# Patient Record
Sex: MALE | Race: WHITE | Employment: OTHER | URBAN - METROPOLITAN AREA
[De-identification: names, ages, dates, MRNs, and addresses within clinical notes are randomized per-mention and may not be internally consistent; named-entity substitution may affect disease eponyms.]

---

## 2018-11-13 ENCOUNTER — HOSPITAL ENCOUNTER (OUTPATIENT)
Dept: GENERAL RADIOLOGY | Facility: CLINIC | Age: 68
Discharge: HOME | End: 2018-11-13
Attending: FAMILY MEDICINE | Admitting: FAMILY MEDICINE
Payer: MEDICARE

## 2018-11-13 ENCOUNTER — OFFICE VISIT (OUTPATIENT)
Dept: FAMILY MEDICINE | Facility: CLINIC | Age: 68
End: 2018-11-13
Payer: COMMERCIAL

## 2018-11-13 VITALS
HEART RATE: 86 BPM | OXYGEN SATURATION: 97 % | BODY MASS INDEX: 30.16 KG/M2 | DIASTOLIC BLOOD PRESSURE: 88 MMHG | SYSTOLIC BLOOD PRESSURE: 168 MMHG | TEMPERATURE: 97.6 F | RESPIRATION RATE: 16 BRPM | WEIGHT: 170.2 LBS | HEIGHT: 63 IN

## 2018-11-13 DIAGNOSIS — Z12.11 COLON CANCER SCREENING: ICD-10-CM

## 2018-11-13 DIAGNOSIS — M75.101 ROTATOR CUFF TEAR ARTHROPATHY OF RIGHT SHOULDER: ICD-10-CM

## 2018-11-13 DIAGNOSIS — M25.511 ACUTE PAIN OF RIGHT SHOULDER: Primary | ICD-10-CM

## 2018-11-13 DIAGNOSIS — M25.511 ACUTE PAIN OF RIGHT SHOULDER: ICD-10-CM

## 2018-11-13 DIAGNOSIS — M12.811 ROTATOR CUFF TEAR ARTHROPATHY OF RIGHT SHOULDER: ICD-10-CM

## 2018-11-13 DIAGNOSIS — I10 HYPERTENSION GOAL BP (BLOOD PRESSURE) < 140/90: ICD-10-CM

## 2018-11-13 DIAGNOSIS — E78.5 HYPERLIPIDEMIA LDL GOAL <100: ICD-10-CM

## 2018-11-13 LAB
CHOLEST SERPL-MCNC: 270 MG/DL
DEPRECATED CALCIDIOL+CALCIFEROL SERPL-MC: 27 UG/L (ref 20–75)
HDLC SERPL-MCNC: 62 MG/DL
LDLC SERPL CALC-MCNC: 183 MG/DL
NONHDLC SERPL-MCNC: 208 MG/DL
TRIGL SERPL-MCNC: 123 MG/DL
TSH SERPL DL<=0.005 MIU/L-ACNC: 1.19 MU/L (ref 0.4–4)

## 2018-11-13 PROCEDURE — 36415 COLL VENOUS BLD VENIPUNCTURE: CPT | Performed by: FAMILY MEDICINE

## 2018-11-13 PROCEDURE — 20610 DRAIN/INJ JOINT/BURSA W/O US: CPT | Mod: RT | Performed by: FAMILY MEDICINE

## 2018-11-13 PROCEDURE — 84443 ASSAY THYROID STIM HORMONE: CPT | Performed by: FAMILY MEDICINE

## 2018-11-13 PROCEDURE — 73030 X-RAY EXAM OF SHOULDER: CPT | Mod: TC

## 2018-11-13 PROCEDURE — 82306 VITAMIN D 25 HYDROXY: CPT | Performed by: FAMILY MEDICINE

## 2018-11-13 PROCEDURE — 99214 OFFICE O/P EST MOD 30 MIN: CPT | Mod: 25 | Performed by: FAMILY MEDICINE

## 2018-11-13 PROCEDURE — 80061 LIPID PANEL: CPT | Performed by: FAMILY MEDICINE

## 2018-11-13 RX ORDER — LIDOCAINE HYDROCHLORIDE 10 MG/ML
3 INJECTION, SOLUTION INFILTRATION; PERINEURAL ONCE
Qty: 3 ML | Refills: 0 | OUTPATIENT
Start: 2018-11-13 | End: 2018-11-13

## 2018-11-13 RX ORDER — LISINOPRIL 5 MG/1
5 TABLET ORAL DAILY
Qty: 90 TABLET | Refills: 3 | Status: SHIPPED | OUTPATIENT
Start: 2018-11-13 | End: 2019-05-28

## 2018-11-13 RX ORDER — ATORVASTATIN CALCIUM 20 MG/1
20 TABLET, FILM COATED ORAL DAILY
Qty: 30 TABLET | Refills: 3 | Status: SHIPPED | OUTPATIENT
Start: 2018-11-13 | End: 2018-12-13

## 2018-11-13 RX ORDER — TRIAMCINOLONE ACETONIDE 40 MG/ML
40 INJECTION, SUSPENSION INTRA-ARTICULAR; INTRAMUSCULAR ONCE
Qty: 1 ML | Refills: 0 | OUTPATIENT
Start: 2018-11-13 | End: 2018-11-13

## 2018-11-13 RX ORDER — BUPIVACAINE HYDROCHLORIDE 5 MG/ML
1 INJECTION, SOLUTION PERINEURAL ONCE
Qty: 1 ML | Refills: 0 | OUTPATIENT
Start: 2018-11-13 | End: 2018-11-13

## 2018-11-13 RX ORDER — NAPROXEN 500 MG/1
500 TABLET ORAL 2 TIMES DAILY PRN
Qty: 60 TABLET | Refills: 0 | Status: SHIPPED | OUTPATIENT
Start: 2018-11-13 | End: 2018-12-13

## 2018-11-13 ASSESSMENT — PAIN SCALES - GENERAL: PAINLEVEL: EXTREME PAIN (8)

## 2018-11-13 NOTE — PROGRESS NOTES
SUBJECTIVE:   Andrea Conde is a 68 year old male who presents to clinic today for the following health issues:    Joint Pain    Onset: 3 months for left elbow, November 4 for the right shoulder    Description:   Location: right shoulder and left elbow  Character: right shoulder burning, left elbow dull ache    Intensity: severe    Progression of Symptoms: same    Accompanying Signs & Symptoms:  Other symptoms: right shoulder radiation of pain to right hand,     History:   Previous similar pain: no       Precipitating factors:   Trauma or overuse: YES - fell and hurt right shoulder, hit the left elbow when he was working on his tractor    Alleviating factors:  Improved by: nothing    Therapies Tried and outcome: ice, aleve, tylenol - none helped      Andrea is here today for the right shoulder pain.  He is known to have right shoulder impingement syndrome with rotator cuff arthropathy.  He saw an orthopedic about a year ago for it; did not recommend surgery.  He been doing well with minimal pain except of light weaker on the right shoulder.  On Nov 4, he tripped, failed and landed on his face.  It happened in the middle of the night so not really sure what exactly was happening.  It was on the floor for a while but was able to get up and went to bed.  He lives by himself.  The next day when he woke up, his started to have right shoulder pain with neck and shoulder stiffness.  The stiffness had resolved but the should pain persists.  Over-the-counter medication has not been effective.  Has not been seen for this.  In fact has not seen a doctor for more than a year.  It is constant and sharp, worse with movement of the right arm.  Radiate down to the deltoid area.  No weakness.  No numbness or tingling sensation.  No other concern.    Is known to have high blood pressure.  Was on medication for a while.  Been off the medication for over a years as well.  Has not checked his BP.  Denied of chest pain or shortness  "of breath.  No headache or dizziness.  No leg swelling, orthopnea or dyspnea.      Problem list and histories reviewed & adjusted, as indicated.  Additional history: as documented    Current Outpatient Prescriptions   Medication Sig Dispense Refill     lisinopril (PRINIVIL/ZESTRIL) 5 MG tablet Take 1 tablet (5 mg) by mouth daily 90 tablet 3     naproxen (NAPROSYN) 500 MG tablet Take 1 tablet (500 mg) by mouth 2 times daily as needed for moderate pain 60 tablet 0     No Known Allergies    Reviewed and updated as needed this visit by clinical staff  Tobacco  Allergies  Meds  Soc Hx      Reviewed and updated as needed this visit by Provider         ROS:  Constitutional, HEENT, cardiovascular, pulmonary, gi and gu systems are negative, except as otherwise noted.    OBJECTIVE:     /88 (BP Location: Left arm, Patient Position: Sitting, Cuff Size: Adult Regular)  Pulse 86  Temp 97.6  F (36.4  C) (Temporal)  Resp 16  Ht 5' 3.25\" (1.607 m)  Wt 170 lb 3.2 oz (77.2 kg)  SpO2 97%  BMI 29.91 kg/m2  Body mass index is 29.91 kg/(m^2).   GENERAL: healthy, alert and no distress   HENT: ear canals and TM's normal. Nares are non-congestive.  Oropharynx is pink ans moist. No tender with palpation to the sinuses.  NECK: supple, no adenopathy and thyroid normal to palpation.  No tender with palpation to the cervical spine and its para-vertebral muscle.  RESP: lungs clear to auscultation - no rales, rhonchi or wheezes  CV: regular rate and rhythm, no murmur.  No leg swelling  MS: No gross musculoskeletal defects noted, no edema.  Both hands are equally in strength and shoulders are symmetrical.  Tender with palpation to the right shoulder with has no swelling or redness - no warmth to touch.  Mild tender with palpation to the right deltoid muscle.  NEURO: Normal strength and tone, no focal deficit.      Diagnostic Test Results:  Results for orders placed or performed in visit on 11/13/18 (from the past 24 hour(s))   Lipid " panel reflex to direct LDL Fasting   Result Value Ref Range    Cholesterol 270 (H) <200 mg/dL    Triglycerides 123 <150 mg/dL    HDL Cholesterol 62 >39 mg/dL    LDL Cholesterol Calculated 183 (H) <100 mg/dL    Non HDL Cholesterol 208 (H) <130 mg/dL   TSH with free T4 reflex   Result Value Ref Range    TSH 1.19 0.40 - 4.00 mU/L     Xray - reviewed with patient and reviewed with the radiologist    ASSESSMENT/PLAN:     1. Acute pain of right shoulder  Precipitated after the fall about a week ago.  Known to have rotator cuff arthropathy.  Most likely due to arthritis flareup from the fall.  Xray today showed DJD of the shoulder.  Discussed with him about the nature of the condition.  Treatment options discussed include steroid injection.  He wants to give it a try and please see procedure note for further details.  If failed injections, consider physical therapy versus orthopedic referral.  Naproxen as needed for pain.  May continue with Tylenol as needed as well.  Normal activities as tolerated.  Call in or follow-up if any concerns or question.    - naproxen (NAPROSYN) 500 MG tablet; Take 1 tablet (500 mg) by mouth 2 times daily as needed for moderate pain  Dispense: 60 tablet; Refill: 0    2. Rotator cuff tear arthropathy of right shoulder  Stable, please see #1 above for further details.    - naproxen (NAPROSYN) 500 MG tablet; Take 1 tablet (500 mg) by mouth 2 times daily as needed for moderate pain  Dispense: 60 tablet; Refill: 0    3. Hypertension goal BP (blood pressure) < 140/90  Not controlled and his BP is high today. Not on medication for over a year.  Discussed with him about the nature of the condition and emphasize the importance of having it under controlled. Educated him about the long and short term consequences of uncontrolled HTN as well as the goal for his blood pressure.  Start the Lisinopril and side effect discussed.  Encouraged low salt diet and daily excercise. Lab as other.  Follow up in 1  month, earlier as needed.    - Lipid panel reflex to direct LDL Fasting  - TSH with free T4 reflex  - Vitamin D Deficiency  - lisinopril (PRINIVIL/ZESTRIL) 5 MG tablet; Take 1 tablet (5 mg) by mouth daily  Dispense: 90 tablet; Refill: 3  - atorvastatin (LIPITOR) 20 MG tablet; Take 1 tablet (20 mg) by mouth daily  Dispense: 30 tablet; Refill: 3    4. Hyperlipidemia  New diagnosis.  Discussed with him about the nature condition.  Emphasized on healthy diet and exercise.  Start low-dose of Lipitor.  Side effect discussed.  Monitor the liver function test in a month.    Colon cancer screening  Refused colonoscopy.  Not sure about the FIT test.  Discussed with him about the importance of colon cancer screening.  He is willing to take the risk.    - Fecal colorectal cancer screen (FIT); Future      Frank Greene Mai, MD  [facility]

## 2018-11-13 NOTE — NURSING NOTE
Chief Complaint   Patient presents with     Elbow left     x3 months     Shoulder right     fell november 4 [facility] Maintenance Due   Topic Date Due     HEPATITIS C SCREENING  06/18/1967     COLON CANCER SCREEN (SYSTEM ASSIGNED)  06/18/1999     PNEUMOCOCCAL (1 of 2 - PCV13) 06/18/2014     AORTIC ANEURYSM SCREENING (SYSTEM ASSIGNED)  06/18/2014     LIPID SCREEN Q5 YR MALE (SYSTEM ASSIGNED)  02/14/2017     ADVANCE DIRECTIVE PLANNING Q5 YRS  07/23/2017     INFLUENZA VACCINE (1) 09/01/2018     Natasha Maloney, [facility]

## 2018-11-13 NOTE — MR AVS SNAPSHOT
After Visit Summary   11/13/2018    Andrea Conde    MRN: [id number]           Patient Information     Date Of Birth          1950        Visit Information        Provider Department      11/13/2018 10:40 AM Frank Womack MD [facility]        Today's Diagnoses     Acute pain of right shoulder    -  1    Rotator cuff tear arthropathy of right shoulder        Hypertension goal BP (blood pressure) < 140/90        Colon cancer screening           Follow-ups after your visit        Follow-up notes from your care team     Return in about 1 month (around 12/13/2018) for folow up - high blood pressure.      Future tests that were ordered for you today     Open Future Orders        Priority Expected Expires Ordered    Fecal colorectal cancer screen (FIT) Routine 12/4/2018 2/5/2019 11/13/2018    XR Shoulder Right G/E 3 Views Routine 11/13/2018 11/13/2019 11/13/2018            Who to contact     If you have questions or need follow up information about today's clinic visit or your schedule please contact [facility] directly at [phone].  Normal or non-critical lab and imaging results will be communicated to you by MyChart, letter or phone within 4 business days after the clinic has received the results. If you do not hear from us within 7 days, please contact the clinic through MyChart or phone. If you have a critical or abnormal lab result, we will notify you by phone as soon as possible.  Submit refill requests through [employer] or call your pharmacy and they will forward the refill request to us. Please allow 3 business days for your refill to be completed.          Additional Information About Your Visit        Care EveryWhere ID     This is your Care EveryWhere ID. This could be used by other organizations to access your [facility] medical records  [id number]        Your Vitals Were     Pulse Temperature Respirations Height Pulse Oximetry BMI (Body Mass Index)    86  "97.6  F (36.4  C) (Temporal) 16 5' 3.25\" (1.607 m) 97% 29.91 kg/m2       Blood Pressure from Last 3 Encounters:   11/13/18 168/88   07/10/14 (!) 149/92   06/30/14 148/86    Weight from Last 3 Encounters:   11/13/18 170 lb 3.2 oz (77.2 kg)   07/10/14 172 lb (78 kg)   06/30/14 172 lb (78 kg)              We Performed the Following     Lipid panel reflex to direct LDL Fasting     TSH with free T4 reflex     Vitamin D Deficiency          Today's Medication Changes          These changes are accurate as of 11/13/18 12:24 PM.  If you have any questions, ask your nurse or doctor.               Start taking these medicines.        Dose/Directions    lisinopril 5 MG tablet   Commonly known as:  PRINIVIL/ZESTRIL   Used for:  Hypertension goal BP (blood pressure) < 140/90   Started by:  Frank Womack MD        Dose:  5 mg   Take 1 tablet (5 mg) by mouth daily   Quantity:  90 tablet   Refills:  3       naproxen 500 MG tablet   Commonly known as:  NAPROSYN   Used for:  Acute pain of right shoulder, Rotator cuff tear arthropathy of right shoulder   Started by:  Frank Womack MD        Dose:  500 mg   Take 1 tablet (500 mg) by mouth 2 times daily as needed for moderate pain   Quantity:  60 tablet   Refills:  0            Where to get your medicines      These medications were sent to [facility] [place] [id number] [facility]  [address] , [place] [place] [facility] [address]     Phone:  [phone]     lisinopril 5 MG tablet    naproxen 500 MG tablet                Primary Care Provider Fax #    Physician No Ref-Primary [phone]       No address on file        Equal Access to Services     JENA MUNGUIA AH: Hadii sanjuana Moseley, wamireyada luqadaha, qaybta kaalmada adeegyada, arelis waddell. So [facility] [phone].    ATENCIÓN: Si habla español, tiene a peck disposición servicios gratuitos de asistencia lingüística. Llame al [phone].    We comply with applicable federal civil rights laws " and [place] laws. We do not discriminate on the basis of race, color, national origin, age, disability, sex, sexual orientation, or gender identity.            Thank you!     Thank you for choosing [facility]  for your care. Our goal is always to provide you with excellent care. Hearing back from our patients is one way we can continue to improve our services. Please take a few minutes to complete the written survey that you may receive in the mail after your visit with us. Thank you!             Your Updated Medication List - Protect others around you: Learn how to safely use, store and throw away your medicines at www.disposemymeds.org.          This list is accurate as of 11/13/18 12:24 PM.  Always use your most recent med list.                   Brand Name Dispense Instructions for use Diagnosis    lisinopril 5 MG tablet    PRINIVIL/ZESTRIL    90 tablet    Take 1 tablet (5 mg) by mouth daily    Hypertension goal BP (blood pressure) < 140/90       naproxen 500 MG tablet    NAPROSYN    60 tablet    Take 1 tablet (500 mg) by mouth 2 times daily as needed for moderate pain    Acute pain of right shoulder, Rotator cuff tear arthropathy of right shoulder

## 2018-11-14 ENCOUNTER — TELEPHONE (OUTPATIENT)
Dept: FAMILY MEDICINE | Facility: CLINIC | Age: 68
End: 2018-11-14

## 2018-11-14 NOTE — PROCEDURES
Procedure:  Shoulder Kenolog injection  Indication:  Shoulder pain with arthritis.    The whole precedure discussed with the patient.  Risks associated with the procedure was also discussed, which include but not limited pain, bleeding and infection.  Alternatives were also discussed.  Patient requested to have the injection on his right shoulder today.  Consent was obtained.    Time out performed - he was identified times three.       The whole procedure was done in an usual sterile maner.  The inserted site was cleaned with Iodine solution.  A mixture of 1 cc of 0.25% Marcain, 3 cc of 1% Lidocain and 1 cc of Kenolog (40 mg) inject directly into the right shoulder, entering posteriorly.  Succeeded with first attempt.  Patient tolerates well and has significant relief from the injection.  Post procedure care discussed and educate about symptoms that need to be seen or call in.    Frank Womack MD

## 2018-11-14 NOTE — TELEPHONE ENCOUNTER
----- Message from Frank Greene Mai, MD sent at 11/13/2018  6:13 PM CST -----  Please let patient know that his thyroid level was normal.  His cholesterol is quite high.  Recommend to be treated and the prescription was sent to the pharmacy.  Take it as prescribed.  Also recommend to work on healthy diet and exercise.  Will discussed more about this at the next visit.

## 2018-12-13 ENCOUNTER — OFFICE VISIT (OUTPATIENT)
Dept: FAMILY MEDICINE | Facility: CLINIC | Age: 68
End: 2018-12-13
Payer: COMMERCIAL

## 2018-12-13 VITALS
WEIGHT: 170.2 LBS | BODY MASS INDEX: 29.91 KG/M2 | SYSTOLIC BLOOD PRESSURE: 138 MMHG | DIASTOLIC BLOOD PRESSURE: 84 MMHG | HEART RATE: 88 BPM | RESPIRATION RATE: 16 BRPM | TEMPERATURE: 98.4 F | OXYGEN SATURATION: 100 %

## 2018-12-13 DIAGNOSIS — I10 HYPERTENSION GOAL BP (BLOOD PRESSURE) < 140/90: Primary | ICD-10-CM

## 2018-12-13 DIAGNOSIS — E78.5 HYPERLIPIDEMIA LDL GOAL <100: ICD-10-CM

## 2018-12-13 DIAGNOSIS — R97.20 ELEVATED PROSTATE SPECIFIC ANTIGEN (PSA): ICD-10-CM

## 2018-12-13 DIAGNOSIS — M25.522 LEFT ELBOW PAIN: ICD-10-CM

## 2018-12-13 DIAGNOSIS — Z12.5 SCREENING FOR PROSTATE CANCER: ICD-10-CM

## 2018-12-13 LAB
ALBUMIN SERPL-MCNC: 4.4 G/DL (ref 3.4–5)
ALP SERPL-CCNC: 72 U/L (ref 40–150)
ALT SERPL W P-5'-P-CCNC: 28 U/L (ref 0–70)
ANION GAP SERPL CALCULATED.3IONS-SCNC: 9 MMOL/L (ref 3–14)
AST SERPL W P-5'-P-CCNC: 18 U/L (ref 0–45)
BILIRUB SERPL-MCNC: 0.9 MG/DL (ref 0.2–1.3)
BUN SERPL-MCNC: 24 MG/DL (ref 7–30)
CALCIUM SERPL-MCNC: 9.1 MG/DL (ref 8.5–10.1)
CHLORIDE SERPL-SCNC: 106 MMOL/L (ref 94–109)
CO2 SERPL-SCNC: 26 MMOL/L (ref 20–32)
CREAT SERPL-MCNC: 1.18 MG/DL (ref 0.66–1.25)
GFR SERPL CREATININE-BSD FRML MDRD: 61 ML/MIN/1.7M2
GLUCOSE SERPL-MCNC: 94 MG/DL (ref 70–99)
POTASSIUM SERPL-SCNC: 4 MMOL/L (ref 3.4–5.3)
PROT SERPL-MCNC: 7.7 G/DL (ref 6.8–8.8)
PSA SERPL-ACNC: 4.15 UG/L (ref 0–4)
SODIUM SERPL-SCNC: 141 MMOL/L (ref 133–144)

## 2018-12-13 PROCEDURE — 99214 OFFICE O/P EST MOD 30 MIN: CPT | Performed by: FAMILY MEDICINE

## 2018-12-13 PROCEDURE — G0103 PSA SCREENING: HCPCS | Performed by: FAMILY MEDICINE

## 2018-12-13 PROCEDURE — 80053 COMPREHEN METABOLIC PANEL: CPT | Performed by: FAMILY MEDICINE

## 2018-12-13 PROCEDURE — 36415 COLL VENOUS BLD VENIPUNCTURE: CPT | Performed by: FAMILY MEDICINE

## 2018-12-13 RX ORDER — ATORVASTATIN CALCIUM 20 MG/1
20 TABLET, FILM COATED ORAL DAILY
Qty: 30 TABLET | Refills: 3 | Status: SHIPPED | OUTPATIENT
Start: 2018-12-13 | End: 2018-12-15 | Stop reason: DRUGHIGH

## 2018-12-13 RX ORDER — NAPROXEN 500 MG/1
500 TABLET ORAL 2 TIMES DAILY PRN
Qty: 180 TABLET | Refills: 1 | Status: SHIPPED | OUTPATIENT
Start: 2018-12-13

## 2018-12-13 ASSESSMENT — PAIN SCALES - GENERAL: PAINLEVEL: MODERATE PAIN (5)

## 2018-12-13 NOTE — PROGRESS NOTES
SUBJECTIVE:   Andrea Conde is a 68 year old male who presents to clinic today for the following health issues:    Hypertension Follow-up      Outpatient blood pressures are being checked at home.  Results are 135/90.    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  Stated that he has had it for years which has been controlled with lisinopril at 5 mg daily.  No side effect.  Does not check his blood pressure at home.  Denies of headache or dizziness.  No acute change in his vision.  No chest pain, shortness of breath.  No leg swelling, orthopnea or dyspnea.  Also known to have high cholesterol and has been taking the Lipitor for it. Ran out of the medication for couple weeks.  Not exercise regularly but very active at work, moving all day long.    Also has the left elbow pain, started since when he hit it on a tractor about 4 months ago.  It was painful initially but getting better slowly.  Is concerned because it has not gone away completely yet despite happened 4 months ago.  No swelling or redness.  No numbness or tingling sensation.  Pain is worse still with movement of he forearm or using of his arm.    Problem list and histories reviewed & adjusted, as indicated.  Additional history: as documented    Current Outpatient Medications   Medication Sig Dispense Refill     atorvastatin (LIPITOR) 20 MG tablet Take 1 tablet (20 mg) by mouth daily 30 tablet 3     lisinopril (PRINIVIL/ZESTRIL) 5 MG tablet Take 1 tablet (5 mg) by mouth daily 90 tablet 3     naproxen (NAPROSYN) 500 MG tablet Take 1 tablet (500 mg) by mouth 2 times daily as needed for moderate pain 180 tablet 1     No Known Allergies    Reviewed and updated as needed this visit by clinical staff  Tobacco  Allergies   Meds  Soc Hx      Reviewed and updated as needed this visit by Provider         ROS:  Constitutional, HEENT, cardiovascular, pulmonary, gi and gu systems are negative, except as otherwise noted.    OBJECTIVE:     /84   Pulse 88   Temp 98.4  F (36.9  C) (Temporal)   Resp 16   Wt 77.2 kg (170 lb 3.2 oz)   SpO2 100%   BMI 29.91 kg/m    Body mass index is 29.91 kg/m .  GENERAL: healthy, alert and no distress  NECK: no adenopathy, supple, no lymphadenopathy or thyromegaly.  No tender with palpation to the cervical spine.  RESP: lungs clear to auscultation - no rales, rhonchi or wheezes  CV: regular rate and rhythm, no murmur, no peripheral edema and peripheral pulses strong  ABDOMEN: soft, nontender, nondistended, no palpable masses organomegaly with normal bowel sounds.  MS: no gross musculoskeletal defects noted, no edema.  Both hands are equally in strength.  Poor shoulder exam was normal.  Left elbow with no swelling or redness.  Normal range of motion.  Mild tender with palpation to the back of his elbow.  Pain was all also triggered with pronation and supination of the forearm.  NEURO: Normal strength and tone, mentation intact and speech normal.  Cranial nerves II through XII intact.  DTRs +2 throughout.    Diagnostic Test Results:  Results for orders placed or performed in visit on 12/13/18   Comprehensive metabolic panel (BMP + Alb, Alk Phos, ALT, AST, Total. Bili, TP)   Result Value Ref Range    Sodium 141 133 - 144 mmol/L    Potassium 4.0 3.4 - 5.3 mmol/L    Chloride 106 94 - 109 mmol/L    Carbon Dioxide 26 20 - 32 mmol/L    Anion Gap 9 3 - 14 mmol/L    Glucose 94 70 - 99 mg/dL    Urea Nitrogen 24 7 - 30 mg/dL    Creatinine 1.18 0.66 - 1.25 mg/dL    GFR Estimate 61 >60 mL/min/1.7m2    GFR Estimate If Black 74 >60 mL/min/1.7m2    Calcium 9.1 8.5 - 10.1 mg/dL    Bilirubin Total 0.9 0.2 - 1.3 mg/dL    Albumin 4.4 3.4 - 5.0 g/dL    Protein Total 7.7 6.8 - 8.8 g/dL    Alkaline Phosphatase 72 40 - 150  U/L    ALT 28 0 - 70 U/L    AST 18 0 - 45 U/L   PSA, screen   Result Value Ref Range    PSA 4.15 (H) 0 - 4 ug/L       ASSESSMENT/PLAN:     1. Hypertension goal BP (blood pressure) < 140/90  BP is normal and stable. Encouraged him to keep up the good work.  Continue with the current doses of lisinopril.  Been tolerating it well.  Emphasized on healthy/low salt diet, daily excercise andweight loss. Avoid high sugar/caffeine intake. Lab as ordered.  Follow up in a year, earlier as needed.  Recommend to stop by the clinic for blood pressure check in about 6 months.  Also will need to get the BMP check in 6 months.    - Comprehensive metabolic panel (BMP + Alb, Alk Phos, ALT, AST, Total. Bili, TP)    2. Hyperlipidemia LDL goal <100  Cholesterol checked today and it showed total cholesterol 270, HDL 62 and .  Discussed with him about the significance of finding.  Again emphasized on healthy lifestyle modification with healthy diet, daily exercise and weight loss.  Increaseed Lipitor from 20 mg that he has been taking to 40 mg.  Recheck the AST and ALT in 3 months and lipid panel in 6 months months.  If it remains to be elevated, will need to treat more aggressively.  Side effect discussed and encouraged him to call if any concerns or question.    - Comprehensive metabolic panel (BMP + Alb, Alk Phos, ALT, AST, Total. Bili, TP)  - atorvastatin (LIPITOR) 20 MG tablet; Take 1 tablet (20 mg) by mouth daily  Dispense: 30 tablet; Refill: 3    3. Left elbow pain  Most likely due to contusion, possible fracture.  Has had for 4 months and is getting better slowly.  Not affecting his daily activities.  Discussed about x-ray versus monitoring.  He want to hold off on the x-ray for now.  Continue with his normal activities as tolerated.  Naproxen as needed for pain.  Call in if the symptoms persist or get worse, will order an x-ray for him then.    - naproxen (NAPROSYN) 500 MG tablet; Take 1 tablet (500 mg) by mouth 2 times  daily as needed for moderate pain  Dispense: 180 tablet; Refill: 1    4. Screening for prostate cancer  He requested to have PSA screening for prostate cancer.  No history of abnormal PSA level.  I discussed with him about the pros and cons as well the controversy around the PSA screening test.  I informed him that elevated PSA does not mean that he has prostate cancer but will generally need further evaluation.  He would like to be checked today.  He does not want the prostate check digitally.    His PSA level is slightly elevated.  He was given the option to see the urologist right away or to recheck the PSA level in about a month.  If it remains to be elevated then will refer to urologist.  He elected the later option.  All of his questions were answered.    - PSA, screen      Frank Greene Mai, MD  [facility]

## 2018-12-13 NOTE — NURSING NOTE
Chief Complaint   Patient presents with     Hypertension     Elbow left     x4 months, hit it on a framework      Health Maintenance Due   Topic Date Due     FIT Q1 YR  06/18/1959     HEPATITIS C SCREENING  06/18/1967     ZOSTER IMMUNIZATION (1 of 2) 06/18/1999     DTAP/TDAP/TD IMMUNIZATION (2 - Td) 01/06/2010     PNEUMOVAX IMMUNIZATION 65+ LOW/MEDIUM RISK (1 of 2 - PCV13) 06/18/2014     AORTIC ANEURYSM SCREENING (SYSTEM ASSIGNED)  06/18/2014     ADVANCE DIRECTIVE PLANNING Q5 YRS  07/23/2017     Patient was given advanced directive planning information.  Patient states he has FIT kit at home.    Natasha Maloney, CMA

## 2018-12-14 ENCOUNTER — TELEPHONE (OUTPATIENT)
Dept: FAMILY MEDICINE | Facility: CLINIC | Age: 68
End: 2018-12-14

## 2018-12-14 DIAGNOSIS — Z12.5 ENCOUNTER FOR SCREENING FOR MALIGNANT NEOPLASM OF PROSTATE: ICD-10-CM

## 2018-12-14 DIAGNOSIS — R97.20 ABNORMAL PROSTATE SPECIFIC ANTIGEN: Primary | ICD-10-CM

## 2018-12-14 NOTE — TELEPHONE ENCOUNTER
Patient was informed that his PSA level was slightly elevated.  Recommend to see a urologist or recheck PSA level again in a month and will go from there.  Patient wants to recheck PSA.  His kidney function test, liver function test and electrolytes were normal  No diabetes.      Patient is scheduled on 1/14/18 for lab only.  Orders placed.

## 2018-12-14 NOTE — TELEPHONE ENCOUNTER
----- Message from Frank Greene Mai, MD sent at 12/13/2018  7:23 PM CST -----  Please let patient know that his PSA level was slightly elevated.  Recommend either to see a urologist or recheck the PSA level again in a month and will go from there.  Let me know which one he prefers.  His kidney function test, liver function test and electrolytes were normal.  No diabetes.  Thank you

## 2018-12-15 PROBLEM — R97.20 ELEVATED PROSTATE SPECIFIC ANTIGEN (PSA): Status: ACTIVE | Noted: 2018-12-15

## 2018-12-15 RX ORDER — ATORVASTATIN CALCIUM 40 MG/1
40 TABLET, FILM COATED ORAL DAILY
Qty: 90 TABLET | Refills: 1 | Status: SHIPPED | OUTPATIENT
Start: 2018-12-15 | End: 2019-05-28

## 2019-01-09 ENCOUNTER — TELEPHONE (OUTPATIENT)
Dept: FAMILY MEDICINE | Facility: CLINIC | Age: 69
End: 2019-01-09

## 2019-01-09 NOTE — TELEPHONE ENCOUNTER
Panel Management Review      Patient has the following on his problem list:     Hypertension   Last three blood pressure readings:  BP Readings from Last 3 Encounters:   12/13/18 138/84   11/13/18 168/88   07/10/14 (!) 149/92     Blood pressure: Passed    HTN Guidelines:  Age 18-59 BP range:  Less than 140/90  Age 60-85 with Diabetes:  Less than 140/90  Age 60-85 without Diabetes:  less than 150/90      Composite cancer screening  Chart review shows that this patient is due/due soon for the following Fecal Colorectal (FIT)  Summary:    Patient is due/failing the following:   FIT    Action needed:   Patient needs referral/order: FIT Testing    Type of outreach:    Phone, left message for patient to call back.     Questions for provider review:    None                                                                                                                                    Natasha Maloney CMA       Chart routed to Care Team .

## 2019-01-09 NOTE — TELEPHONE ENCOUNTER
Left message for patient call back. Patient is due for FIT Test. Patient has one at home. If he still has it please finish it and drop it off or stop at lab and pick one up to complete. Please assist patient in scheduling. If patient declines or has had elsewhere please note and route back to care team.        Natasha Maloney, [facility]

## 2019-01-09 NOTE — TELEPHONE ENCOUNTER
Patient called and message relayed to him. Pt states he will complete it and bring it in on Monday.

## 2019-01-10 DIAGNOSIS — Z12.11 COLON CANCER SCREENING: ICD-10-CM

## 2019-01-10 LAB — HEMOCCULT STL QL IA: NEGATIVE

## 2019-01-10 PROCEDURE — 82274 ASSAY TEST FOR BLOOD FECAL: CPT | Performed by: FAMILY MEDICINE

## 2019-01-14 ENCOUNTER — TELEPHONE (OUTPATIENT)
Dept: FAMILY MEDICINE | Facility: CLINIC | Age: 69
End: 2019-01-14

## 2019-01-14 DIAGNOSIS — Z12.5 ENCOUNTER FOR SCREENING FOR MALIGNANT NEOPLASM OF PROSTATE: ICD-10-CM

## 2019-01-14 DIAGNOSIS — R97.20 ABNORMAL PROSTATE SPECIFIC ANTIGEN: ICD-10-CM

## 2019-01-14 LAB — PSA SERPL-ACNC: 3.83 UG/L (ref 0–4)

## 2019-01-14 PROCEDURE — G0103 PSA SCREENING: HCPCS | Performed by: FAMILY MEDICINE

## 2019-01-14 PROCEDURE — 36415 COLL VENOUS BLD VENIPUNCTURE: CPT | Performed by: FAMILY MEDICINE

## 2019-01-14 NOTE — TELEPHONE ENCOUNTER
----- Message from Frank Greene Mai, MD sent at 1/11/2019  5:45 PM CST -----  Please let patient know that his fit test was negative for blood.  Recommend to repeat in a year.

## 2019-01-14 NOTE — TELEPHONE ENCOUNTER
Tried to reach patient, left message for patient to call the clinic back.  Natasha Maloney, [facility]

## 2019-05-28 ENCOUNTER — OFFICE VISIT (OUTPATIENT)
Dept: FAMILY MEDICINE | Facility: CLINIC | Age: 69
End: 2019-05-28
Payer: COMMERCIAL

## 2019-05-28 VITALS
OXYGEN SATURATION: 97 % | HEART RATE: 73 BPM | DIASTOLIC BLOOD PRESSURE: 86 MMHG | BODY MASS INDEX: 29.74 KG/M2 | SYSTOLIC BLOOD PRESSURE: 120 MMHG | RESPIRATION RATE: 18 BRPM | TEMPERATURE: 97.5 F | WEIGHT: 169.2 LBS

## 2019-05-28 DIAGNOSIS — I10 HYPERTENSION GOAL BP (BLOOD PRESSURE) < 140/90: ICD-10-CM

## 2019-05-28 DIAGNOSIS — E78.5 HYPERLIPIDEMIA LDL GOAL <100: ICD-10-CM

## 2019-05-28 DIAGNOSIS — M77.12 LATERAL EPICONDYLITIS OF LEFT ELBOW: Primary | ICD-10-CM

## 2019-05-28 LAB
ALBUMIN SERPL-MCNC: 4.4 G/DL (ref 3.4–5)
ALP SERPL-CCNC: 64 U/L (ref 40–150)
ALT SERPL W P-5'-P-CCNC: 37 U/L (ref 0–70)
ANION GAP SERPL CALCULATED.3IONS-SCNC: 5 MMOL/L (ref 3–14)
AST SERPL W P-5'-P-CCNC: 31 U/L (ref 0–45)
BILIRUB SERPL-MCNC: 0.8 MG/DL (ref 0.2–1.3)
BUN SERPL-MCNC: 14 MG/DL (ref 7–30)
CALCIUM SERPL-MCNC: 9.4 MG/DL (ref 8.5–10.1)
CHLORIDE SERPL-SCNC: 109 MMOL/L (ref 94–109)
CO2 SERPL-SCNC: 28 MMOL/L (ref 20–32)
CREAT SERPL-MCNC: 1.24 MG/DL (ref 0.66–1.25)
GFR SERPL CREATININE-BSD FRML MDRD: 59 ML/MIN/{1.73_M2}
GLUCOSE SERPL-MCNC: 85 MG/DL (ref 70–99)
POTASSIUM SERPL-SCNC: 4.4 MMOL/L (ref 3.4–5.3)
PROT SERPL-MCNC: 6.9 G/DL (ref 6.8–8.8)
SODIUM SERPL-SCNC: 142 MMOL/L (ref 133–144)

## 2019-05-28 PROCEDURE — 20551 NJX 1 TENDON ORIGIN/INSJ: CPT | Performed by: FAMILY MEDICINE

## 2019-05-28 PROCEDURE — 80053 COMPREHEN METABOLIC PANEL: CPT | Performed by: FAMILY MEDICINE

## 2019-05-28 PROCEDURE — 36415 COLL VENOUS BLD VENIPUNCTURE: CPT | Performed by: FAMILY MEDICINE

## 2019-05-28 PROCEDURE — 99214 OFFICE O/P EST MOD 30 MIN: CPT | Mod: 25 | Performed by: FAMILY MEDICINE

## 2019-05-28 RX ORDER — TRIAMCINOLONE ACETONIDE 40 MG/ML
40 INJECTION, SUSPENSION INTRA-ARTICULAR; INTRAMUSCULAR ONCE
Status: COMPLETED | OUTPATIENT
Start: 2019-05-28 | End: 2019-05-28

## 2019-05-28 RX ORDER — LIDOCAINE HYDROCHLORIDE 20 MG/ML
2 INJECTION, SOLUTION INFILTRATION; PERINEURAL ONCE
Status: COMPLETED | OUTPATIENT
Start: 2019-05-28 | End: 2019-05-28

## 2019-05-28 RX ORDER — ATORVASTATIN CALCIUM 40 MG/1
40 TABLET, FILM COATED ORAL DAILY
Qty: 90 TABLET | Refills: 1 | Status: SHIPPED | OUTPATIENT
Start: 2019-05-28

## 2019-05-28 RX ORDER — LISINOPRIL 5 MG/1
5 TABLET ORAL DAILY
Qty: 90 TABLET | Refills: 3 | Status: SHIPPED | OUTPATIENT
Start: 2019-05-28

## 2019-05-28 RX ADMIN — TRIAMCINOLONE ACETONIDE 40 MG: 40 INJECTION, SUSPENSION INTRA-ARTICULAR; INTRAMUSCULAR at 16:19

## 2019-05-28 RX ADMIN — LIDOCAINE HYDROCHLORIDE 2 ML: 20 INJECTION, SOLUTION INFILTRATION; PERINEURAL at 16:20

## 2019-05-28 ASSESSMENT — PAIN SCALES - GENERAL: PAINLEVEL: SEVERE PAIN (6)

## 2019-05-28 NOTE — PROGRESS NOTES
Subjective     Andrea Conde is a 68 year old male who presents to clinic today for the following health issues:    HPI   Hyperlipidemia Follow-Up      Are you having any of the following symptoms? (Select all that apply)  Left-sided neck or arm pain    Are you regularly taking any medication or supplement to lower your cholesterol?   Yes- Atorvastatin    Are you having muscle aches or other side effects that you think could be caused by your cholesterol lowering medication?  No      Hypertension Follow-up      Do you check your blood pressure regularly outside of the clinic? Yes     Are you following a low salt diet? Yes    Are your blood pressures ever more than 140 on the top number (systolic) OR more   than 90 on the bottom number (diastolic), for example 140/90? Yes    Amount of exercise or physical activity: None    Problems taking medications regularly: No    Medication side effects: none    Diet: low salt    Andrea is here today for follow-up on his high blood pressure and hypertension.  Started on Lipitor and Lisinopril about 6 months ago and has been taking them as prescribed with no side effect.  Does not check his blood pressure at home.  No headache or dizziness.  No acute change in his vision.  No chest pain or shortness of breath.  No leg swelling, orthopnea or dyspnea.  Does not drink alcohol.  Does not exercise but always keeps himself active [place].  Denies alcohol or drug use.    He also has the left elbow pain that been there for about a year and it is getting worse.  Is mainly on the lateral aspect of the left elbow.  It hurt when he moves or uses his forearm.  The pain started to radiate down to his forearm.  No swelling or redness.  No numbness or tingling sensation.  No weakness.  Never had this before and no history of injury to the elbow that he knows of.  He uses his hands a lot for his projects.  No history of gout arthritis.  No neck or shoulder pain.  No  other concerns.    Current Outpatient Medications   Medication Sig Dispense Refill     atorvastatin (LIPITOR) 40 MG tablet Take 1 tablet (40 mg) by mouth daily 90 tablet 1     lisinopril (PRINIVIL/ZESTRIL) 5 MG tablet Take 1 tablet (5 mg) by mouth daily 90 tablet 3     naproxen (NAPROSYN) 500 MG tablet Take 1 tablet (500 mg) by mouth 2 times daily as needed for moderate pain 180 tablet 1     No Known Allergies      Reviewed and updated as needed this visit by Provider      Review of Systems   ROS COMP: Constitutional, HEENT, cardiovascular, pulmonary, gi and gu systems are negative, except as otherwise noted.      Objective    /86   Pulse 73   Temp 97.5  F (36.4  C) (Temporal)   Resp 18   Wt 76.7 kg (169 lb 3.2 oz)   SpO2 97%   BMI 29.74 kg/m    Body mass index is 29.74 kg/m .  Physical Exam   GENERAL: healthy, alert and no distress  NECK: no adenopathy, supple, no lymphadenopathy or thyromegaly.  No tender with palpation to the cervical spine.  RESP: lungs clear to auscultation - no rales, rhonchi or wheezes  CV: regular rate and rhythm, no murmur, no peripheral edema and peripheral pulses strong  ABDOMEN: soft, nontender, nondistended, no palpable masses or organomegaly with normal bowel sounds.  MS: no gross musculoskeletal defects noted, no edema.  Both hands are equally in strength.  Shoulder and wrist exam were normal.  Left elbow with no swelling no redness and its range of motion was normal.  No pain with pronation or supination of the forearm.  Focal tenderness palpation to the lateral epicondyle area with guarding but no rebound.    Diagnostic Test Results:  Labs reviewed in Epic  Results for orders placed or performed in visit on 05/28/19   Comprehensive metabolic panel (BMP + Alb, Alk Phos, ALT, AST, Total. Bili, TP)   Result Value Ref Range    Sodium 142 133 - 144 mmol/L    Potassium 4.4 3.4 - 5.3 mmol/L    Chloride 109 94 - 109 mmol/L    Carbon Dioxide 28 20 - 32 mmol/L    Anion Gap 5 3 -  14 mmol/L    Glucose 85 70 - 99 mg/dL    Urea Nitrogen 14 7 - 30 mg/dL    Creatinine 1.24 0.66 - 1.25 mg/dL    GFR Estimate 59 (L) >60 mL/min/[1.73_m2]    GFR Estimate If Black 68 >60 mL/min/[1.73_m2]    Calcium 9.4 8.5 - 10.1 mg/dL    Bilirubin Total 0.8 0.2 - 1.3 mg/dL    Albumin 4.4 3.4 - 5.0 g/dL    Protein Total 6.9 6.8 - 8.8 g/dL    Alkaline Phosphatase 64 40 - 150 U/L    ALT 37 0 - 70 U/L    AST 31 0 - 45 U/L           Assessment & Plan     1. Hyperlipidemia LDL goal <100  Tolerated the Lipitor well and will continue with it for now.  Recheck the CMP today.  Diet modification discussed.  Recommend to continue to stay as active as possible.  Avoid alcohol consumption.  Follow-up in 6 months for physical exam.    - atorvastatin (LIPITOR) 40 MG tablet; Take 1 tablet (40 mg) by mouth daily  Dispense: 90 tablet; Refill: 1  - Comprehensive metabolic panel (BMP + Alb, Alk Phos, ALT, AST, Total. Bili, TP)    2. Hypertension goal BP (blood pressure) < 140/90  BP is normal and stable. Encouraged him to keep up the good work.  Continue with the current doses of lisinopril.  Been tolerating it well.  Emphasized on healthy/low salt diet and daily excercise. Avoid high sugar/caffeine intake. Lab as ordered.  Follow up in 6 month for physical, earlier as needed.    - lisinopril (PRINIVIL/ZESTRIL) 5 MG tablet; Take 1 tablet (5 mg) by mouth daily  Dispense: 90 tablet; Refill: 3  - Comprehensive metabolic panel (BMP + Alb, Alk Phos, ALT, AST, Total. Bili, TP)    3. Lateral epicondylitis of left elbow  Discussed with him about the nature of the condition.  Treatment options discussed which including steroid injection.  Hold off on x-ray for now.  He wanted to try the injection and please see the procedure note for further details.  Continue his normal activities as tolerated.  Tylenol or Motrin as needed for pain.  Call in or follow-up if the symptoms persist or get worse.      - Medial/Lateral Epicondylitis injection  -  Lidocaine 2 % injection  - triamcinolone (KENALOG-40) injection 40 mg       Return in about 6 months (around 11/28/2019) for Physical Exam.    Frank Greene Mai, MD  [facility]

## 2019-05-29 ENCOUNTER — TELEPHONE (OUTPATIENT)
Dept: FAMILY MEDICINE | Facility: CLINIC | Age: 69
End: 2019-05-29

## 2019-05-29 NOTE — TELEPHONE ENCOUNTER
----- Message from Frank Greene Mai, MD sent at 5/29/2019  5:51 PM CDT -----  Please let patient know that his labs showed that his kidney function test, liver function test and electrolytes were normal.  No diabetes.  Thank you

## 2019-05-29 NOTE — PROCEDURES
Procedure: Elbow steroid injection.    Indication:  Lateral epicondyitis with increasing pain    The whole procedure discussed with patient in details.  Risks associated with the procedure was also explained which include but not limitted to pain, infection and rupture of the tendons.  Patient agreed to proceed the procedure and he consent obtained    Time out performed.  Patient was identified.  Name and location of the procedure was also identified.    The whole procedure was done in a usual sterile manner.  The lateral epicondyle was identified.  The area was then cleaned with alcohol swaps.  A mixture of 1 cc of kenolog (40 mg) and 2 cc of Lidocain with epi injected directly into the ellow where the most tenderous area.  Patient tolerated the procedure well and feel signifiicant relieve immediately.  Educate symptoms that need to be seen or call in as well.  EBL was zero.    Frank Womack MD.

## 2019-05-29 NOTE — LETTER
[address] [id number] [phone]        May 29, 2019    Andrea Conde  [address] [place] [address] [address] [place] [place] [address]          Dear Andrea,    LAB RESULTS:     The results of your recent Tests were labs showed that his kidney function test, liver function test and electrolytes were normal.  No diabetes.  If you have any further questions or problems, please contact our office at [phone].        Sincerely,        Frank Womack M.D.